# Patient Record
Sex: MALE | Race: WHITE | NOT HISPANIC OR LATINO | Employment: OTHER | URBAN - METROPOLITAN AREA
[De-identification: names, ages, dates, MRNs, and addresses within clinical notes are randomized per-mention and may not be internally consistent; named-entity substitution may affect disease eponyms.]

---

## 2022-02-25 ENCOUNTER — OFFICE VISIT (OUTPATIENT)
Dept: UROLOGY | Facility: CLINIC | Age: 82
End: 2022-02-25
Payer: MEDICARE

## 2022-02-25 VITALS — HEART RATE: 59 BPM | SYSTOLIC BLOOD PRESSURE: 154 MMHG | DIASTOLIC BLOOD PRESSURE: 77 MMHG

## 2022-02-25 DIAGNOSIS — N32.81 OAB (OVERACTIVE BLADDER): Primary | ICD-10-CM

## 2022-02-25 PROCEDURE — 99203 OFFICE O/P NEW LOW 30 MIN: CPT | Mod: S$GLB,,, | Performed by: NURSE PRACTITIONER

## 2022-02-25 PROCEDURE — 99203 PR OFFICE/OUTPT VISIT, NEW, LEVL III, 30-44 MIN: ICD-10-PCS | Mod: S$GLB,,, | Performed by: NURSE PRACTITIONER

## 2022-02-25 RX ORDER — EZETIMIBE 10 MG/1
10 TABLET ORAL DAILY
COMMUNITY
Start: 2021-12-29

## 2022-02-25 RX ORDER — NITROGLYCERIN 0.4 MG/1
TABLET SUBLINGUAL
COMMUNITY
Start: 2021-10-06

## 2022-02-25 RX ORDER — ISOTRETINOIN 40 MG/1
40 CAPSULE ORAL WEEKLY
COMMUNITY

## 2022-02-25 RX ORDER — ATORVASTATIN CALCIUM 80 MG/1
80 TABLET, FILM COATED ORAL DAILY
COMMUNITY
Start: 2021-12-14

## 2022-02-25 RX ORDER — METOPROLOL SUCCINATE 50 MG/1
50 TABLET, EXTENDED RELEASE ORAL DAILY
COMMUNITY
Start: 2021-12-11

## 2022-02-25 RX ORDER — IPRATROPIUM BROMIDE 42 UG/1
SPRAY, METERED NASAL
COMMUNITY
Start: 2022-01-26

## 2022-02-25 RX ORDER — CLOPIDOGREL BISULFATE 75 MG/1
75 TABLET ORAL DAILY
COMMUNITY
Start: 2021-12-11

## 2022-02-25 NOTE — PROGRESS NOTES
Subjective:       Patient ID: Aleksandar Valdez is a 81 y.o. male.    Chief Complaint: Urinary Frequency (Pt co freq worse when its cold weather/Dr Guillaume does PSA)      HPI: 81-year-old male, new to [facility], presents with complaint of urinary frequency.  Patient was formally seen by Dr. Easley, last seen May 2019.  Patient was put on oxybutynin 5 mg TID by Dr. Easley.  Patient had CIS side effects with the medication.  Patient developed dry mouth.  Patient was then put on dietary behavior modifications.    Patient presents today stating he is having urinary frequency.  States he voids multiple times a day.  Patient states he will have sudden episodes of urgency where he has to go to the bathroom quickly.  Patient states he drinks 1 cup of coffee per day.  On some days he may have 1 Coke per day.    Patient denies any pain or burning urination.  Denies any odor urine.  Denies any fever.  Denies any body aches.  Denies having strain to void.  May have occasional nocturia.    Patient's PSA is checked by his PCP.  The patient states his PCP does do his JONATHAN.    No other urinary complaints at this time.       Past Medical History:   Past Medical History:   Diagnosis Date    Allergy     Heart disease     Hypercholesterolemia     Hypertension        Past Surgical Historical:   Past Surgical History:   Procedure Laterality Date    COLONOSCOPY      CORONARY ANGIOGRAPHY INCLUDING BYPASS GRAFTS WITH CATHETERIZATION OF LEFT HEART      quad    HERNIA REPAIR      LUMBAR FUSION          Medications:   Medication List with Changes/Refills   Current Medications    ASCORBIC ACID, VITAMIN C, (VITAMIN C) 100 MG TABLET    Take 100 mg by mouth once daily.    ATORVASTATIN (LIPITOR) 80 MG TABLET    Take 80 mg by mouth once daily.    CLOPIDOGREL (PLAVIX) 75 MG TABLET    Take 75 mg by mouth once daily.    EZETIMIBE (ZETIA) 10 MG TABLET    Take 10 mg by mouth once daily.    IPRATROPIUM (ATROVENT) 42 MCG (0.06 %) NASAL SPRAY     INSTILL 2 SPRAYS INTO EACH NOSTRIL 2 TO 4 TIMES DAILY BEFORE MEALS    ISOTRETINOIN (ACCUTANE) 40 MG CAPSULE    Take 40 mg by mouth once a week.    METOPROLOL SUCCINATE (TOPROL-XL) 50 MG 24 HR TABLET    Take 50 mg by mouth once daily.    NITROGLYCERIN (NITROSTAT) 0.4 MG SL TABLET            Past Social History:   Social History     Socioeconomic History    Marital status:    Substance and Sexual Activity    Alcohol use: Not Currently       Allergies: Review of patient's allergies indicates:  No Known Allergies     Family History:   Family History   Problem Relation Age of Onset    No Known Problems Father     No Known Problems Mother         Review of Systems:  Review of Systems   Constitutional: Negative for activity change and appetite change.   HENT: Negative for congestion and dental problem.    Respiratory: Negative for chest tightness and shortness of breath.    Cardiovascular: Negative for chest pain.   Gastrointestinal: Negative for abdominal distention and abdominal pain.   Genitourinary: Positive for frequency and urgency. Negative for decreased urine volume, difficulty urinating, dysuria, enuresis, flank pain, genital sores, hematuria, penile discharge, penile pain, penile swelling, scrotal swelling and testicular pain.   Musculoskeletal: Negative for back pain and neck pain.   Neurological: Negative for dizziness.   Hematological: Negative for adenopathy.   Psychiatric/Behavioral: Negative for agitation, behavioral problems and confusion.       Physical Exam:  Physical Exam  Vitals and nursing note reviewed.   Constitutional:       Appearance: He is well-developed.   HENT:      Head: Normocephalic.   Cardiovascular:      Rate and Rhythm: Normal rate and regular rhythm.      Heart sounds: Normal heart sounds.   Pulmonary:      Effort: Pulmonary effort is normal.      Breath sounds: Normal breath sounds.   Abdominal:      General: Bowel sounds are normal.      Palpations: Abdomen is soft.    Skin:     General: Skin is warm and dry.   Neurological:      Mental Status: He is alert and oriented to person, place, and time.       Bladder scan:  0 cc  Urinalysis:  Normal    Assessment/Plan:   Overactive bladder:  Patient has tried oxybutynin the past with failure.  Patient developed side effects with oxybutynin, dry mouth.  Patient does not consume a lot of coffee or Cokes, nor other stimulants.  Will do trial of Myrbetriq 25 mg daily.    Patient will follow up in 6 weeks for re-evaluation, sooner if needed.  Problem List Items Addressed This Visit    None     Visit Diagnoses     OAB (overactive bladder)    -  Primary    Relevant Orders    POCT Urinalysis (w/Micro Option)    POCT Bladder Scan

## 2022-04-06 ENCOUNTER — OFFICE VISIT (OUTPATIENT)
Dept: UROLOGY | Facility: CLINIC | Age: 82
End: 2022-04-06
Payer: MEDICARE

## 2022-04-06 VITALS
WEIGHT: 172 LBS | SYSTOLIC BLOOD PRESSURE: 136 MMHG | DIASTOLIC BLOOD PRESSURE: 67 MMHG | HEIGHT: 69 IN | HEART RATE: 69 BPM | BODY MASS INDEX: 25.48 KG/M2

## 2022-04-06 DIAGNOSIS — N32.81 OAB (OVERACTIVE BLADDER): Primary | ICD-10-CM

## 2022-04-06 LAB — POC RESIDUAL URINE VOLUME: 15 ML (ref 0–100)

## 2022-04-06 PROCEDURE — 99213 PR OFFICE/OUTPT VISIT, EST, LEVL III, 20-29 MIN: ICD-10-PCS | Mod: S$GLB,,, | Performed by: NURSE PRACTITIONER

## 2022-04-06 PROCEDURE — 51798 US URINE CAPACITY MEASURE: CPT | Mod: S$GLB,,, | Performed by: NURSE PRACTITIONER

## 2022-04-06 PROCEDURE — 51798 POCT BLADDER SCAN: ICD-10-PCS | Mod: S$GLB,,, | Performed by: NURSE PRACTITIONER

## 2022-04-06 PROCEDURE — 99213 OFFICE O/P EST LOW 20 MIN: CPT | Mod: S$GLB,,, | Performed by: NURSE PRACTITIONER

## 2022-04-06 RX ORDER — SOLIFENACIN SUCCINATE 10 MG/1
10 TABLET, FILM COATED ORAL DAILY
Qty: 30 TABLET | Refills: 11 | Status: SHIPPED | OUTPATIENT
Start: 2022-04-06 | End: 2023-04-06

## 2022-04-06 NOTE — PROGRESS NOTES
Subjective:       Patient ID: Aleksandar Valdez is a 81 y.o. male.    Chief Complaint: Follow-up (6 wk OAB)      HPI: 81-year-old male, established patient, presents for 6 week follow-up.  Patient had presented with complaint of frequency and urgency.  He was diagnosed with overactive bladder.  He had previously been put on oxybutynin 5 mg 3 times a day by Dr. Easley.  However, oxybutynin causes severe dry mouth.  Patient was put on Myrbetriq 25 mg.  Patient states he was having success on Myrbetriq 25 mg daily.  States his frequency and urgency had decreased.  However, patient has medication is too expensive.    This time patient is doing doing well no complaints.  He still had does have some Myrbetriq 25 on hand.  He denies any frequency urgency.  Denies any pain or burning urination.  Denies any odor to the urine.  Denies any fever.  Denies any body aches.  Denies any blood in urine.  No other urinary complaints at this time       Past Medical History:   Past Medical History:   Diagnosis Date    Allergy     Heart disease     Hypercholesterolemia     Hypertension        Past Surgical Historical:   Past Surgical History:   Procedure Laterality Date    COLONOSCOPY      CORONARY ANGIOGRAPHY INCLUDING BYPASS GRAFTS WITH CATHETERIZATION OF LEFT HEART      quad    HERNIA REPAIR      LUMBAR FUSION          Medications:   Medication List with Changes/Refills   New Medications    SOLIFENACIN (VESICARE) 10 MG TABLET    Take 1 tablet (10 mg total) by mouth once daily.   Current Medications    ASCORBIC ACID, VITAMIN C, (VITAMIN C) 100 MG TABLET    Take 100 mg by mouth once daily.    ATORVASTATIN (LIPITOR) 80 MG TABLET    Take 80 mg by mouth once daily.    CLOPIDOGREL (PLAVIX) 75 MG TABLET    Take 75 mg by mouth once daily.    EZETIMIBE (ZETIA) 10 MG TABLET    Take 10 mg by mouth once daily.    IPRATROPIUM (ATROVENT) 42 MCG (0.06 %) NASAL SPRAY    INSTILL 2 SPRAYS INTO EACH NOSTRIL 2 TO 4 TIMES DAILY BEFORE MEALS     ISOTRETINOIN (ACCUTANE) 40 MG CAPSULE    Take 40 mg by mouth once a week.    METOPROLOL SUCCINATE (TOPROL-XL) 50 MG 24 HR TABLET    Take 50 mg by mouth once daily.    NITROGLYCERIN (NITROSTAT) 0.4 MG SL TABLET            Past Social History:   Social History     Socioeconomic History    Marital status:    Substance and Sexual Activity    Alcohol use: Not Currently       Allergies: Review of patient's allergies indicates:  No Known Allergies     Family History:   Family History   Problem Relation Age of Onset    No Known Problems Father     No Known Problems Mother         Review of Systems:  Review of Systems   Constitutional: Negative for activity change and appetite change.   HENT: Negative for congestion and dental problem.    Respiratory: Negative for chest tightness and shortness of breath.    Cardiovascular: Negative for chest pain.   Gastrointestinal: Negative for abdominal distention and abdominal pain.   Genitourinary: Negative for decreased urine volume, difficulty urinating, dysuria, enuresis, flank pain, frequency, genital sores, hematuria, penile discharge, penile pain, penile swelling, scrotal swelling, testicular pain and urgency.   Musculoskeletal: Negative for back pain and neck pain.   Neurological: Negative for dizziness.   Hematological: Negative for adenopathy.   Psychiatric/Behavioral: Negative for agitation, behavioral problems and confusion.       Physical Exam:  Physical Exam  Vitals and nursing note reviewed.   Constitutional:       Appearance: He is well-developed.   HENT:      Head: Normocephalic.   Cardiovascular:      Rate and Rhythm: Normal rate and regular rhythm.      Heart sounds: Normal heart sounds.   Pulmonary:      Effort: Pulmonary effort is normal.      Breath sounds: Normal breath sounds.   Abdominal:      General: Bowel sounds are normal.      Palpations: Abdomen is soft.   Skin:     General: Skin is warm and dry.   Neurological:      Mental Status: He is alert  and oriented to person, place, and time.       Bladder scan:  15 cc    Assessment/Plan:   Overactive bladder:  Patient was doing well Myrbetriq 25 mg, however the medication is very expensive.  Will do trial of VESIcare 10 mg daily.  Patient will notify us of the results.  Will plan follow-up in 6 months, however, medications not working will schedule follow-up sooner.    Follow-up 6 months, sooner if needed.  Problem List Items Addressed This Visit    None     Visit Diagnoses     OAB (overactive bladder)    -  Primary    Relevant Medications    solifenacin (VESICARE) 10 MG tablet    Other Relevant Orders    POCT Bladder Scan